# Patient Record
Sex: FEMALE | Race: OTHER
[De-identification: names, ages, dates, MRNs, and addresses within clinical notes are randomized per-mention and may not be internally consistent; named-entity substitution may affect disease eponyms.]

---

## 2019-01-17 ENCOUNTER — HOSPITAL ENCOUNTER (INPATIENT)
Dept: HOSPITAL 72 - EMR | Age: 44
LOS: 1 days | Discharge: HOME | DRG: 242 | End: 2019-01-18
Payer: MEDICAID

## 2019-01-17 VITALS — SYSTOLIC BLOOD PRESSURE: 106 MMHG | DIASTOLIC BLOOD PRESSURE: 76 MMHG

## 2019-01-17 VITALS — DIASTOLIC BLOOD PRESSURE: 69 MMHG | SYSTOLIC BLOOD PRESSURE: 115 MMHG

## 2019-01-17 VITALS — DIASTOLIC BLOOD PRESSURE: 78 MMHG | SYSTOLIC BLOOD PRESSURE: 101 MMHG

## 2019-01-17 VITALS — DIASTOLIC BLOOD PRESSURE: 64 MMHG | SYSTOLIC BLOOD PRESSURE: 116 MMHG

## 2019-01-17 VITALS — DIASTOLIC BLOOD PRESSURE: 71 MMHG | SYSTOLIC BLOOD PRESSURE: 125 MMHG

## 2019-01-17 VITALS — SYSTOLIC BLOOD PRESSURE: 108 MMHG | DIASTOLIC BLOOD PRESSURE: 52 MMHG

## 2019-01-17 VITALS — SYSTOLIC BLOOD PRESSURE: 117 MMHG | DIASTOLIC BLOOD PRESSURE: 73 MMHG

## 2019-01-17 VITALS — HEIGHT: 57 IN | WEIGHT: 110 LBS | BODY MASS INDEX: 23.73 KG/M2

## 2019-01-17 VITALS — DIASTOLIC BLOOD PRESSURE: 75 MMHG | SYSTOLIC BLOOD PRESSURE: 124 MMHG

## 2019-01-17 VITALS — SYSTOLIC BLOOD PRESSURE: 122 MMHG | DIASTOLIC BLOOD PRESSURE: 69 MMHG

## 2019-01-17 VITALS — SYSTOLIC BLOOD PRESSURE: 101 MMHG | DIASTOLIC BLOOD PRESSURE: 78 MMHG

## 2019-01-17 DIAGNOSIS — F10.129: ICD-10-CM

## 2019-01-17 DIAGNOSIS — R57.8: ICD-10-CM

## 2019-01-17 DIAGNOSIS — K29.70: ICD-10-CM

## 2019-01-17 DIAGNOSIS — K22.6: Primary | ICD-10-CM

## 2019-01-17 LAB
ADD MANUAL DIFF: NO
ADD MANUAL DIFF: NO
ALBUMIN SERPL-MCNC: 3.4 G/DL (ref 3.4–5)
ALBUMIN/GLOB SERPL: 1 {RATIO} (ref 1–2.7)
ALP SERPL-CCNC: 91 U/L (ref 46–116)
ALT SERPL-CCNC: 30 U/L (ref 12–78)
ANION GAP SERPL CALC-SCNC: 14 MMOL/L (ref 5–15)
APPEARANCE UR: CLEAR
APTT BLD: 21 SEC (ref 23–33)
APTT PPP: (no result) S
AST SERPL-CCNC: 29 U/L (ref 15–37)
BASOPHILS NFR BLD AUTO: 0.5 % (ref 0–2)
BASOPHILS NFR BLD AUTO: 0.6 % (ref 0–2)
BILIRUB SERPL-MCNC: 0.4 MG/DL (ref 0.2–1)
BUN SERPL-MCNC: 38 MG/DL (ref 7–18)
CALCIUM SERPL-MCNC: 8.9 MG/DL (ref 8.5–10.1)
CHLORIDE SERPL-SCNC: 101 MMOL/L (ref 98–107)
CO2 SERPL-SCNC: 24 MMOL/L (ref 21–32)
CREAT SERPL-MCNC: 0.9 MG/DL (ref 0.55–1.3)
EOSINOPHIL NFR BLD AUTO: 0.1 % (ref 0–3)
EOSINOPHIL NFR BLD AUTO: 0.5 % (ref 0–3)
ERYTHROCYTE [DISTWIDTH] IN BLOOD BY AUTOMATED COUNT: 14.5 % (ref 11.6–14.8)
ERYTHROCYTE [DISTWIDTH] IN BLOOD BY AUTOMATED COUNT: 14.7 % (ref 11.6–14.8)
GLOBULIN SER-MCNC: 3.3 G/DL
GLUCOSE UR STRIP-MCNC: NEGATIVE MG/DL
HCT VFR BLD CALC: 26.3 % (ref 37–47)
HCT VFR BLD CALC: 31.7 % (ref 37–47)
HGB BLD-MCNC: 10.6 G/DL (ref 12–16)
HGB BLD-MCNC: 8.5 G/DL (ref 12–16)
INR PPP: 1 (ref 0.9–1.1)
KETONES UR QL STRIP: (no result)
LEUKOCYTE ESTERASE UR QL STRIP: (no result)
LYMPHOCYTES NFR BLD AUTO: 28.1 % (ref 20–45)
LYMPHOCYTES NFR BLD AUTO: 9.7 % (ref 20–45)
MCV RBC AUTO: 91 FL (ref 80–99)
MCV RBC AUTO: 92 FL (ref 80–99)
MONOCYTES NFR BLD AUTO: 6.5 % (ref 1–10)
MONOCYTES NFR BLD AUTO: 9.2 % (ref 1–10)
NEUTROPHILS NFR BLD AUTO: 61.7 % (ref 45–75)
NEUTROPHILS NFR BLD AUTO: 83.3 % (ref 45–75)
NITRITE UR QL STRIP: NEGATIVE
PH UR STRIP: 6 [PH] (ref 4.5–8)
PHOSPHATE SERPL-MCNC: 3.1 MG/DL (ref 2.5–4.9)
PLATELET # BLD: 190 K/UL (ref 150–450)
PLATELET # BLD: 247 K/UL (ref 150–450)
POTASSIUM SERPL-SCNC: 3.8 MMOL/L (ref 3.5–5.1)
PROT UR QL STRIP: (no result)
RBC # BLD AUTO: 2.87 M/UL (ref 4.2–5.4)
RBC # BLD AUTO: 3.49 M/UL (ref 4.2–5.4)
SODIUM SERPL-SCNC: 139 MMOL/L (ref 136–145)
SP GR UR STRIP: 1.01 (ref 1–1.03)
UROBILINOGEN UR-MCNC: NORMAL MG/DL (ref 0–1)
WBC # BLD AUTO: 17.3 K/UL (ref 4.8–10.8)
WBC # BLD AUTO: 9.6 K/UL (ref 4.8–10.8)

## 2019-01-17 PROCEDURE — 80329 ANALGESICS NON-OPIOID 1 OR 2: CPT

## 2019-01-17 PROCEDURE — 36415 COLL VENOUS BLD VENIPUNCTURE: CPT

## 2019-01-17 PROCEDURE — 96375 TX/PRO/DX INJ NEW DRUG ADDON: CPT

## 2019-01-17 PROCEDURE — 81025 URINE PREGNANCY TEST: CPT

## 2019-01-17 PROCEDURE — 0DB68ZX EXCISION OF STOMACH, VIA NATURAL OR ARTIFICIAL OPENING ENDOSCOPIC, DIAGNOSTIC: ICD-10-PCS

## 2019-01-17 PROCEDURE — 81003 URINALYSIS AUTO W/O SCOPE: CPT

## 2019-01-17 PROCEDURE — 0DB78ZX EXCISION OF STOMACH, PYLORUS, VIA NATURAL OR ARTIFICIAL OPENING ENDOSCOPIC, DIAGNOSTIC: ICD-10-PCS

## 2019-01-17 PROCEDURE — 94003 VENT MGMT INPAT SUBQ DAY: CPT

## 2019-01-17 PROCEDURE — 83735 ASSAY OF MAGNESIUM: CPT

## 2019-01-17 PROCEDURE — 99285 EMERGENCY DEPT VISIT HI MDM: CPT

## 2019-01-17 PROCEDURE — 85610 PROTHROMBIN TIME: CPT

## 2019-01-17 PROCEDURE — 93970 EXTREMITY STUDY: CPT

## 2019-01-17 PROCEDURE — 94150 VITAL CAPACITY TEST: CPT

## 2019-01-17 PROCEDURE — 86900 BLOOD TYPING SEROLOGIC ABO: CPT

## 2019-01-17 PROCEDURE — 86850 RBC ANTIBODY SCREEN: CPT

## 2019-01-17 PROCEDURE — 85007 BL SMEAR W/DIFF WBC COUNT: CPT

## 2019-01-17 PROCEDURE — 85730 THROMBOPLASTIN TIME PARTIAL: CPT

## 2019-01-17 PROCEDURE — 96361 HYDRATE IV INFUSION ADD-ON: CPT

## 2019-01-17 PROCEDURE — 84100 ASSAY OF PHOSPHORUS: CPT

## 2019-01-17 PROCEDURE — 93005 ELECTROCARDIOGRAM TRACING: CPT

## 2019-01-17 PROCEDURE — 82150 ASSAY OF AMYLASE: CPT

## 2019-01-17 PROCEDURE — 80307 DRUG TEST PRSMV CHEM ANLYZR: CPT

## 2019-01-17 PROCEDURE — 80053 COMPREHEN METABOLIC PANEL: CPT

## 2019-01-17 PROCEDURE — 86901 BLOOD TYPING SEROLOGIC RH(D): CPT

## 2019-01-17 PROCEDURE — 85025 COMPLETE CBC W/AUTO DIFF WBC: CPT

## 2019-01-17 PROCEDURE — 96374 THER/PROPH/DIAG INJ IV PUSH: CPT

## 2019-01-17 PROCEDURE — 83690 ASSAY OF LIPASE: CPT

## 2019-01-17 RX ADMIN — HUMAN INSULIN SCH MLS/HR: 100 INJECTION, SOLUTION SUBCUTANEOUS at 15:52

## 2019-01-17 NOTE — NUR
ED Nurse Note:



RECIEVED PT FROM HOME ACTIVELY VOMITING, HERE WITH C/O NAUSEA, EMESIS AND ABD 
PAIN X 1 DAY, TP HAS HX OF ETOH BUT STATES THIS FEEL DIFFERENT AND SHE REPORTS 
BLOODY EMESIS, PT GOWNED AND PALCED ON CARDIAC MONITORING, PT IS Ivorian 
SPEAKING ONLY, PT DAUGHTER AT BEDSIDE AND ASSISTING WITH TRANSLATION, PT DENEIS 
CP, NO SOB OR LABORED BREATHING, WILL CLOSELY MONITOR AND RESUME CARE AS 
ORDERED.

## 2019-01-17 NOTE — NUR
NURSE NOTES:



PATIENT BACK TO UNIT FROM PACU. VSS. DENIES ANY PAIN AS OF THE MOMENT. NO SIGNS OF DISTRESS. 
WILL CONTINUE TO MONITOR.

## 2019-01-17 NOTE — NUR
ED Nurse Note:



Pt remaining bed without visible s/s of cardiac or pulmonary distress, Pt 
verbalized pain abdominal pain level 5/10 with cramping. Pt reported that she 
vomited x1 since she got to ER but before 7am. Pt denied chest pain, N/V/D. No 
external bleeding noted. Skin intact and warm to touch. Daughter at bedside.

## 2019-01-17 NOTE — ENDOSCOPY PROCEDURE NOTE
Endoscopy Procedure Note


General


Indication for Procedure:  gib


Procedures Performed:  EGD


Operative Findings/Diagnosis:  MWT


Specimen:  yes


Pt Tolerated Procedure Well:  Yes


Estimated Blood Loss:  none





Anesthesia


Anesthesiologist:  albert


Anesthesia:  MAC





Inserted Devices


Implant(s) used?:  No





GI Core Measures


50 yrs or older w/o bx or poly:  Not Applicable


10yrs. F/U not recommended:  Not Applicable











Pio Sinclair MD Jan 17, 2019 12:10

## 2019-01-17 NOTE — GI INITIAL CONSULT NOTE
History of Present Illness


General


Date patient seen:  Jan 17, 2019


Time patient seen:  11:42


Reason for Hospitalization:  Gastrointestinal Bleed


Referring physician:  AMADA ARROYO


Reason for Consultation:  UGIB





Present Illness


HPI


43-year-old female patient presented in the emergency room complaining 

excessive emesis with melena and hematochezia.  The patient has admitted to 

excessive alcohol abuse in the past.  The patient presents today with 

normocytic anemia and leukocytosis.  No known history of endoscopic colonoscopy.


Home Meds


Active Scripts


Guaifenesin (Guaifenesin) 1,200 Mg Tab.er.12h, 1200 MG PO Q12HR for 10 Days, #

20 TAB


   Prov:Svitlana Alvarez         10/30/17


Albuterol Sulfate* (ALBUTEROL SULFATE MDI*) 8.5 Gm Hfa.aer.ad, 2 PUFF INH Q3H, #

1 INH 0 Refills


   Prov:Svitlana Alvarez         10/30/17


Azithromycin* (ZITHROMAX*) 250 Mg Tablet, 250 MG ORAL DAILY, #6 TAB 0 Refills


   Take two tables once daily for 1 day, then one tablet once daily for 4


   days.


   Prov:Svitlana Alvarez         10/30/17


Codeine/Promethazine Hcl* (PROMETHAZINE-CODEINE SYRUP*) 118 Ml Syrup, 5 ML ORAL 

Q6H PRN for For Cough, #120 ML 0 Refills


   Prov:Svitlana Alvarez         10/30/17


Tramadol Hcl* (ULTRAM*) 50 Mg Tablet, 50 MG ORAL Q6H PRN for For Pain, #20 TAB


   Prov:CARMEN SEPULVEDA D.O.         4/14/15


Reported Medications


No Known Medications* (NKM - No Known Medications*)  ., 0 ., 0 Refills


   1/17/19


Med list reviewed/reconciled:  Yes


Allergies:  


Coded Allergies:  


     No Known Allergies (Unverified , 9/23/12)





Patient History


History Provided By:  Patient, Medical Record


Social History:  Reports: alcohol use





Review of Systems


All Other Systems:  negative except mentioned in HPI





Physical Exam





Vital Signs








  Date Time  Temp Pulse Resp B/P (MAP) Pulse Ox O2 Delivery O2 Flow Rate FiO2


 


1/17/19 05:36 97.7 115 15 100/51 99 Room Air  


 


1/17/19 07:30        98








Sp02 EP Interpretation:  reviewed, normal


Labs





Laboratory Tests








Test


  1/17/19


05:55 1/17/19


07:40


 


White Blood Count


  17.3 K/UL


(4.8-10.8)  H 


 


 


Red Blood Count


  3.49 M/UL


(4.20-5.40)  L 


 


 


Hemoglobin


  10.6 G/DL


(12.0-16.0)  L 


 


 


Hematocrit


  31.7 %


(37.0-47.0)  L 


 


 


Mean Corpuscular Volume 91 FL (80-99)   


 


Mean Corpuscular Hemoglobin


  30.3 PG


(27.0-31.0) 


 


 


Mean Corpuscular Hemoglobin


Concent 33.4 G/DL


(32.0-36.0) 


 


 


Red Cell Distribution Width


  14.5 %


(11.6-14.8) 


 


 


Platelet Count


  247 K/UL


(150-450) 


 


 


Mean Platelet Volume


  10.0 FL


(6.5-10.1) 


 


 


Neutrophils (%) (Auto)


  83.3 %


(45.0-75.0)  H 


 


 


Lymphocytes (%) (Auto)


  9.7 %


(20.0-45.0)  L 


 


 


Monocytes (%) (Auto)


  6.5 %


(1.0-10.0) 


 


 


Eosinophils (%) (Auto)


  0.1 %


(0.0-3.0) 


 


 


Basophils (%) (Auto)


  0.5 %


(0.0-2.0) 


 


 


Prothrombin Time


  10.4 SEC


(9.30-11.50) 


 


 


Prothromb Time International


Ratio 1.0 (0.9-1.1)  


  


 


 


Activated Partial


Thromboplast Time 21 SEC (23-33)


L 


 


 


Sodium Level


  139 MMOL/L


(136-145) 


 


 


Potassium Level


  3.8 MMOL/L


(3.5-5.1) 


 


 


Chloride Level


  101 MMOL/L


() 


 


 


Carbon Dioxide Level


  24 MMOL/L


(21-32) 


 


 


Anion Gap


  14 mmol/L


(5-15) 


 


 


Blood Urea Nitrogen


  38 mg/dL


(7-18)  H 


 


 


Creatinine


  0.9 MG/DL


(0.55-1.30) 


 


 


Estimat Glomerular Filtration


Rate > 60 mL/min


(>60) 


 


 


Glucose Level


  167 MG/DL


()  H 


 


 


Calcium Level


  8.9 MG/DL


(8.5-10.1) 


 


 


Phosphorus Level


  3.1 MG/DL


(2.5-4.9) 


 


 


Magnesium Level


  1.6 MG/DL


(1.8-2.4)  L 


 


 


Total Bilirubin


  0.4 MG/DL


(0.2-1.0) 


 


 


Aspartate Amino Transf


(AST/SGOT) 29 U/L (15-37)


  


 


 


Alanine Aminotransferase


(ALT/SGPT) 30 U/L (12-78)


  


 


 


Alkaline Phosphatase


  91 U/L


() 


 


 


Total Protein


  6.7 G/DL


(6.4-8.2) 


 


 


Albumin


  3.4 G/DL


(3.4-5.0) 


 


 


Globulin 3.3 g/dL   


 


Albumin/Globulin Ratio 1.0 (1.0-2.7)   


 


Lipase


  80 U/L


() 


 


 


Salicylates Level


  0.7 ug/mL


(2.8-20)  L 


 


 


Acetaminophen Level


  < 2 MCG/ML


(10-30)  L 


 


 


Serum Alcohol < 3 mg/dL   


 


Urine Color  Pale yellow  


 


Urine Appearance  Clear  


 


Urine pH  6 (4.5-8.0)  


 


Urine Specific Gravity


  


  1.015


(1.005-1.035)


 


Urine Protein


  


  1+ (NEGATIVE)


H


 


Urine Glucose (UA)


  


  Negative


(NEGATIVE)


 


Urine Ketones


  


  3+ (NEGATIVE)


H


 


Urine Blood


  


  1+ (NEGATIVE)


H


 


Urine Nitrite


  


  Negative


(NEGATIVE)


 


Urine Bilirubin


  


  Negative


(NEGATIVE)


 


Urine Urobilinogen


  


  Normal MG/DL


(0.0-1.0)


 


Urine Leukocyte Esterase


  


  3+ (NEGATIVE)


H


 


Urine RBC


  


  0-2 /HPF (0 -


2)


 


Urine WBC


  


  2-4 /HPF (0 -


2)


 


Urine Squamous Epithelial


Cells 


  Few /LPF


(NONE/OCC)


 


Urine Bacteria


  


  Few /HPF


(NONE)


 


Urine HCG, Qualitative


  


  Negative


(NEGATIVE)


 


Urine Opiates Screen


  


  Negative


(NEGATIVE)


 


Urine Barbiturates Screen


  


  Negative


(NEGATIVE)


 


Phencyclidine (PCP) Screen


  


  Negative


(NEGATIVE)


 


Urine Amphetamines Screen


  


  Negative


(NEGATIVE)


 


Urine Benzodiazepines Screen


  


  Negative


(NEGATIVE)


 


Urine Cocaine Screen


  


  Negative


(NEGATIVE)


 


Urine Marijuana (THC) Screen


  


  Negative


(NEGATIVE)








General Appearance:  well appearing, no apparent distress, alert


Head:  normocephalic


EENT:  PERRL/EOMI, normal ENT inspection


Neck:  supple


Respiratory:  normal breath sounds, no respiratory distress


Cardiovascular:  normal rate


Gastrointestinal:  normal inspection, non tender, soft, normal bowel sounds, non

-distended


Rectal:  deferred


Genitourinary:  no CVA tenderness


Musculoskeletal:  normal inspection, back normal


Neurologic:  normal inspection, alert, oriented x3, responsive


Psychiatric:  normal inspection, judgement/insight normal, memory normal


Skin:  normal inspection, normal color, no rash, warm/dry, palpation normal, 

well hydrated


Lymphatic:  normal inspection, no adenopathy


Current Medications





Current Medications








 Medications


  (Trade)  Dose


 Ordered  Sig/Heladio


 Route


 PRN Reason  Start Time


 Stop Time Status Last Admin


Dose Admin


 


 Acetaminophen


  (Tylenol)  650 mg  Q4H  PRN


 ORAL


 fever (temp>100.5F)  1/17/19 10:00


 2/16/19 09:59   


 


 


 Al Hydroxide/Mg


 Hydroxide


  (Mylanta II)  30 ml  Q6H  PRN


 ORAL


 dyspepsia  1/17/19 10:00


 2/16/19 09:59   


 


 


 Dextrose


  (Dextrose 50%)  25 ml  Q30M  PRN


 IV


 Hypoglycemia  1/17/19 10:00


 2/16/19 09:59   


 


 


 Dextrose


  (Dextrose 50%)  50 ml  Q30M  PRN


 IV


 Hypoglycemia  1/17/19 10:00


 2/16/19 09:59   


 


 


 Dextrose/Sodium


 Chloride  1,000 ml @ 


 100 mls/hr  Q10H


 IV


   1/17/19 10:15


 2/16/19 10:14   


 


 


 Diphenhydramine


 HCl


  (Benadryl)  25 mg  Q6H  PRN


 ORAL


 Itching/Pruritis  1/17/19 10:00


 2/16/19 09:59   


 


 


 Ondansetron HCl


  (Zofran)  4 mg  Q6H  PRN


 IVP


 Nausea & Vomiting  1/17/19 10:00


 2/16/19 09:59   


 


 


 Phytonadione 10


 mg/Dextrose  56 ml @ 


 110 mls/hr  ONCE  ONCE


 IVPB


   1/17/19 12:00


 1/17/19 12:30   


 


 


 Temazepam


  (Restoril)  15 mg  HSPRN  PRN


 ORAL


 Insomnia  1/17/19 10:00


 1/24/19 09:59   


 











GI: Plan


Problems:  


(1) GI bleed


(2) Hemorrhagic shock


(3) ETOH abuse


Plan


EGD scheduled for today


Maintain n.p.o. IV fluids


PPI twice daily


Zofran as needed


We will follow with additional recommendations post procedure





Discussed with Dr. Sinclair.


Thank you for this patient referral, we will follow.





The patient was seen and examined at bedside and all new and available data was 

reviewed in the patients chart. I agree with the above findings, impression 

and plan.  (Patient seen earlier today. Signature stamp does not reflect 

patient encounter time.). - MD Anjelica Becker,[facility] CenterRomulo NP Jan 17, 2019 11:45

## 2019-01-17 NOTE — EMERGENCY ROOM REPORT
History of Present Illness


General


Chief Complaint:  Gastrointestinal Bleed


Source:  Patient, Medical Record





Present Illness


HPI


Patient is a 43-year-old female presented after increased hematemesis.  Patient 

had gradual onset of symptoms.  She reports of increased heavy alcohol use.  

Patient had prior history of patient reports having associated epigastric 

discomfort.  She had been noted to have multiple episodes which she vomited 

some dark colored material.  Patient denies any fever.  She denies any NSAID 

use.  She had reportedly been drinking heavily over the past few days.


Allergies:  


Coded Allergies:  


     No Known Allergies (Unverified , 9/23/12)





Patient History


Past Medical History:  see triage record


Last Menstrual Period:  UNK


Pregnant Now:  No


Reviewed Nursing Documentation:  PMH: Agreed; PSxH: Agreed





Nursing Documentation-PMH


Past Medical History:  No History, Except For


Hx Cardiac Problems:  No


Hx Cancer:  No


Hx Gastrointestinal Problems:  No


Hx Neurological Problems:  No





Review of Systems


All Other Systems:  negative except mentioned in HPI





Physical Exam





Vital Signs








  Date Time  Temp Pulse Resp B/P (MAP) Pulse Ox O2 Delivery O2 Flow Rate FiO2


 


1/17/19 05:36 97.7 115 15 100/51 99 Room Air  


 


1/17/19 07:30        98


 


1/17/19 12:18       3 








Sp02 EP Interpretation:  reviewed, normal


General Appearance:  normal inspection, alert, GCS 15, moderate distress


Head:  atraumatic


ENT:  normal ENT inspection, hearing grossly normal, normal voice


Neck:  normal inspection, full range of motion, supple, no bony tend


Respiratory:  normal inspection, lungs clear, normal breath sounds, no 

respiratory distress, no retraction, no wheezing


Cardiovascular #1:  no edema, tachycardia


Gastrointestinal:  normal inspection, normal bowel sounds, non tender, soft, no 

guarding, no hernia


Genitourinary:  no CVA tenderness


Musculoskeletal:  normal inspection, back normal, normal range of motion


Neurologic:  normal inspection, alert, oriented x3, responsive, CNs III-XII nml 

as tested, speech normal


Psychiatric:  normal inspection, judgement/insight normal, mood/affect normal


Skin:  normal inspection, normal color, no rash





Medical Decision Making


Diagnostic Impression:  


 Primary Impression:  


 GI bleed


 Additional Impression:  


 Alcohol abuse


ER Course


.  Patient presented for abdominal pain. Differential diagnoses included 

ischemic bowel, appendicitis, perforated viscus, abdominal aortic aneurysm, 

inferior myocardial infarction, viral gastroenteritis. because of complexity of 

patient's case laboratory testing and imaging studies were ordered.  Patient 

was noted to have some evidence of anemia.  She was initially noted to be 

somewhat tachycardic.  Patient's initial hemoglobin was noted to be adequate 

patient was started on IV acid blockers as well as IV fluids.  Dr. Norberto Cintron 

was contacted for inpatient management





Laboratory Tests








Test


  1/17/19


05:55 1/17/19


07:40 1/17/19


17:45


 


White Blood Count


  17.3 K/UL


(4.8-10.8)  H 


  9.6 K/UL


(4.8-10.8)


 


Red Blood Count


  3.49 M/UL


(4.20-5.40)  L 


  2.87 M/UL


(4.20-5.40)  L


 


Hemoglobin


  10.6 G/DL


(12.0-16.0)  L 


  8.5 G/DL


(12.0-16.0)  L


 


Hematocrit


  31.7 %


(37.0-47.0)  L 


  26.3 %


(37.0-47.0)  L


 


Mean Corpuscular Volume 91 FL (80-99)    92 FL (80-99)  


 


Mean Corpuscular Hemoglobin


  30.3 PG


(27.0-31.0) 


  29.8 PG


(27.0-31.0)


 


Mean Corpuscular Hemoglobin


Concent 33.4 G/DL


(32.0-36.0) 


  32.5 G/DL


(32.0-36.0)


 


Red Cell Distribution Width


  14.5 %


(11.6-14.8) 


  14.7 %


(11.6-14.8)


 


Platelet Count


  247 K/UL


(150-450) 


  190 K/UL


(150-450)


 


Mean Platelet Volume


  10.0 FL


(6.5-10.1) 


  10.5 FL


(6.5-10.1)  H


 


Neutrophils (%) (Auto)


  83.3 %


(45.0-75.0)  H 


  61.7 %


(45.0-75.0)


 


Lymphocytes (%) (Auto)


  9.7 %


(20.0-45.0)  L 


  28.1 %


(20.0-45.0)


 


Monocytes (%) (Auto)


  6.5 %


(1.0-10.0) 


  9.2 %


(1.0-10.0)


 


Eosinophils (%) (Auto)


  0.1 %


(0.0-3.0) 


  0.5 %


(0.0-3.0)


 


Basophils (%) (Auto)


  0.5 %


(0.0-2.0) 


  0.6 %


(0.0-2.0)


 


Prothrombin Time


  10.4 SEC


(9.30-11.50) 


  


 


 


Prothrombin Time INR 1.0 (0.9-1.1)    


 


PTT


  21 SEC (23-33)


L 


  


 


 


Sodium Level


  139 MMOL/L


(136-145) 


  


 


 


Potassium Level


  3.8 MMOL/L


(3.5-5.1) 


  


 


 


Chloride Level


  101 MMOL/L


() 


  


 


 


Carbon Dioxide Level


  24 MMOL/L


(21-32) 


  


 


 


Anion Gap


  14 mmol/L


(5-15) 


  


 


 


Blood Urea Nitrogen


  38 mg/dL


(7-18)  H 


  


 


 


Creatinine


  0.9 MG/DL


(0.55-1.30) 


  


 


 


Estimate Glomerular


Filtration Rate > 60 mL/min


(>60) 


  


 


 


Glucose Level


  167 MG/DL


()  H 


  


 


 


Calcium Level


  8.9 MG/DL


(8.5-10.1) 


  


 


 


Phosphorus Level


  3.1 MG/DL


(2.5-4.9) 


  


 


 


Magnesium Level


  1.6 MG/DL


(1.8-2.4)  L 


  


 


 


Total Bilirubin


  0.4 MG/DL


(0.2-1.0) 


  


 


 


Aspartate Amino Transferase


(AST) 29 U/L (15-37)


  


  


 


 


Alanine Aminotransferase (ALT)


  30 U/L (12-78)


  


  


 


 


Alkaline Phosphatase


  91 U/L


() 


  


 


 


Total Protein


  6.7 G/DL


(6.4-8.2) 


  


 


 


Albumin


  3.4 G/DL


(3.4-5.0) 


  


 


 


Globulin 3.3 g/dL    


 


Albumin/Globulin Ratio 1.0 (1.0-2.7)    


 


Lipase


  80 U/L


() 


  


 


 


Salicylates Level


  0.7 ug/mL


(2.8-20)  L 


  


 


 


Acetaminophen Level


  < 2 MCG/ML


(10-30)  L 


  


 


 


Serum Alcohol < 3 mg/dL    


 


Urine Color  Pale yellow   


 


Urine Appearance  Clear   


 


Urine pH  6 (4.5-8.0)   


 


Urine Specific Gravity


  


  1.015


(1.005-1.035) 


 


 


Urine Protein


  


  1+ (NEGATIVE)


H 


 


 


Urine Glucose (UA)


  


  Negative


(NEGATIVE) 


 


 


Urine Ketones


  


  3+ (NEGATIVE)


H 


 


 


Urine Blood


  


  1+ (NEGATIVE)


H 


 


 


Urine Nitrite


  


  Negative


(NEGATIVE) 


 


 


Urine Bilirubin


  


  Negative


(NEGATIVE) 


 


 


Urine Urobilinogen


  


  Normal MG/DL


(0.0-1.0) 


 


 


Urine Leukocyte Esterase


  


  3+ (NEGATIVE)


H 


 


 


Urine RBC


  


  0-2 /HPF (0 -


2) 


 


 


Urine WBC


  


  2-4 /HPF (0 -


2) 


 


 


Urine Squamous Epithelial


Cells 


  Few /LPF


(NONE/OCC) 


 


 


Urine Bacteria


  


  Few /HPF


(NONE) 


 


 


Urine HCG, Qualitative


  


  Negative


(NEGATIVE) 


 


 


Urine Opiates Screen


  


  Negative


(NEGATIVE) 


 


 


Urine Barbiturates Screen


  


  Negative


(NEGATIVE) 


 


 


Phencyclidine (PCP) Screen


  


  Negative


(NEGATIVE) 


 


 


Urine Amphetamines Screen


  


  Negative


(NEGATIVE) 


 


 


Urine Benzodiazepines Screen


  


  Negative


(NEGATIVE) 


 


 


Urine Cocaine Screen


  


  Negative


(NEGATIVE) 


 


 


Urine Marijuana (THC) Screen


  


  Negative


(NEGATIVE) 


 











Last Vital Signs








  Date Time  Temp Pulse Resp B/P (MAP) Pulse Ox O2 Delivery O2 Flow Rate FiO2


 


1/17/19 20:00 98.4 96 18 108/52 (70)    


 


1/17/19 18:29     100   


 


1/17/19 12:35      Nasal Cannula 3 


 


1/17/19 08:20        98








Status:  unchanged


Disposition:  ADMITTED AS INPATIENT


Condition:  Serious


Referrals:  


NON PHYSICIAN (PCP)











Paul Lenz MD Jan 17, 2019 22:30

## 2019-01-17 NOTE — HISTORY AND PHYSICAL REPORT
DATE OF ADMISSION:  01/17/2019

CHIEF COMPLAINT:  The patient is a 43-year-old  female, who

presents with a chief complaint of abdominal pain, nausea, vomiting, and

hematemesis.



HISTORY OF PRESENT ILLNESS:  Began on Tuesday, 01/15/2019.  The patient

began to experience darker colored vomitus.  The patient also had

epigastric pain.  The patient also noticed her stools were dark.



The patient states this progressed to blood-tinged vomit.  The patient

presented to [place] emergency room.  The patient is admitted with

hematemesis and melena to rule out gastrointestinal hemorrhage.



REVIEW OF SYSTEMS:  CONSTITUTIONAL:  The patient denies weight loss or

gain.  The patient denies fevers or chills.  HEENT:  The patient denies

ear or throat pain.  The patient denies headache.  CARDIOVASCULAR:  The

patient denies palpitations or chest pain.  CHEST:  The patient denies

wheeze or shortness of breath.  ABDOMEN:  The patient complains of

epigastric pain as above.  The patient complains of nausea and vomiting.

The patient complains of hematemesis.  The patient complains of melena.

GENITOURINARY:  The patient denies dysuria or increased frequency of

urination.  NEUROMUSCULAR:  The patient denies seizures or generalized

weakness.



PAST MEDICAL HISTORY:  The patient denies.



PAST SURGICAL HISTORY:  The patient denies.



CURRENT MEDICATIONS:  The patient denies.



ALLERGIES:  No known drug allergies.



SOCIAL HISTORY:  The patient is  and works as an aide in a nursing

home.  The patient denies tobacco use.  The patient admits to alcohol use.

The patient states she binges on the weekends with 1 to 4 drinks daily on

the weekends.



PHYSICAL EXAMINATION:

VITAL SIGNS:  Temperature 98.2, respirations 18, pulse 117, and blood

pressure 134/75.

GENERAL:  The patient is a well-developed and well-nourished 

female, in no apparent distress.

HEENT:  Eyes, pupils are equal and responsive to light and accommodation.

Extraocular movements are intact.

NECK:  Supple without lymphadenopathy.

CHEST:  Lungs are clear to auscultation bilaterally without wheezes or

rales.

CARDIOVASCULAR:  Regular rhythm and rate.  S1, S2 are normal without

murmurs, rubs, or gallops.

ABDOMEN:  Soft and tender to palpation in the epigastric region without

rebound or guarding noted.

EXTREMITIES:  Negative for clubbing, cyanosis, or edema.

RECTAL/GENITAL:  Refused.

NEUROLOGIC:  Cranial nerves II through XII are grossly intact without focal

deficits.  Motor strength is 5/5 bilaterally.  Deep tendon reflexes are 2+

plantar.



LABORATORY STUDIES:  WBC 17.2, hemoglobin 10.6, hematocrit 31.7, and

platelets 247,000.  Sodium 139, potassium 3.8, chloride 101, CO2 24, BUN

38, creatinine 0.9, and glucose 167.



ASSESSMENT:  The patient is a 43-year-old  female.



1. Hematemesis.

2. Melena.

3. Nausea with vomiting.

4. Epigastric pain.



TREATMENT:  Hematemesis/melena/nausea with vomiting.  A Gastroenterology

consultation has been obtained with Dr. Pio Sinclair.  The patient is

waiting endoscopy.  The patient has been started empirically on Protonix

for presumed gastrointestinal hemorrhage.  We will follow recommendations

of Gastroenterology.









  ______________________________________________

  Osiel Vidal M.D.





DR:  PEACE

D:  01/17/2019 17:25

T:  01/17/2019 19:19

JOB#:  [id number]

CC:

## 2019-01-17 NOTE — NUR
TRANSFER TO FLOOR:



Patient transferred to [facility] per [facility]. Report given to TIMO Jones RN .  Belongings given to 
KAYLIN. Dgt informed of transfer. VSS.

## 2019-01-17 NOTE — IMMEDIATE POST-OP EVALUATION
Immediate Post-Op Evalulation


Immediate Post-Op Evalulation


Procedure:  egd/bx


Date of Evaluation:  Jan 17, 2019


Time of Evaluation:  12:30


IV Fluids:  150ml 0.9ns


Blood Products:  none


Estimated Blood Loss:  negligible


Blood Pressure Systolic:  124


Blood Pressure Diastolic:  75


Pulse Rate:  117


Respiratory Rate:  18


O2 Sat by Pulse Oximetry:  100


Temperature (Fahrenheit):  98.2


Pain Score (1-10):  0


Nausea:  No


Vomiting:  No


Complications


none


Patient Status:  awake, reacts, patent


Hydration Status:  adequate


Drug:  Maribell Zambrano MD Jan 17, 2019 18:27

## 2019-01-17 NOTE — NUR
ED Nurse Note:



Pt was transferred in stable condition to [facility] and report and belonging list 
given to CLIFF Riley.

## 2019-01-17 NOTE — HISTORY & PHYSICAL
History and Physical


History & Physicial


Dictated for Int Med-Dr Cintron no. [id number].











Osiel Vidal MD Jan 17, 2019 17:25

## 2019-01-17 NOTE — NUR
NURSE NOTES:



PATIENT WHEELED DOWN TO GI LAB BY TRANSPORTER WITH OFF TELE ORDER. VSS. NO SIGNS OF 
DISTRESS. REPORT GIVEN TO NOD IN GI LAB THROUGH PHONE. WILL CONTINUE TO MONITOR.

## 2019-01-17 NOTE — 48 HOUR POST ANESTHESIA EVAL
Post Anesthesia Evaluation


Procedure:  egd/bx


Date of Evaluation:  Jan 17, 2019


Time of Evaluation:  12:32


Blood Pressure Systolic:  125


0:  71


Pulse Rate:  99


Respiratory Rate:  18


Temperature (Fahrenheit):  98.2


O2 Sat by Pulse Oximetry:  100


Airway:  patent


Nausea:  No


Vomiting:  No


Pain Intensity:  0


Hydration Status:  adequate


Cardiopulmonary Status:


stable


Mental Status/LOC:  patient returned to baseline


Post-Anesthesia Complications:


none


Follow-up care needed:  N/A











Maribell Morgan MD Jan 17, 2019 18:29

## 2019-01-17 NOTE — PULMONOLGY CRITICAL CARE NOTE
Critical Care - Asmt/Plan


Problems:  


(1) Hemorrhagic shock


(2) GI bleed


(3) ETOH abuse


Respiratory:  adjust tidal volume, adjust FIO2


Cardiac:  continue to monitor HR/BP


Renal:  F/U I&O, keep IV fluid


Infectious Disease:  check cultures


Gastrointestinal:  hold feedings


Endocrine:  monitor blood sugar


Neurologic:  PRN Ativan, PRN Morphine


Prophylaxis:  SCDs


Time Spent (Minutes):  40


Notes Reviewed:  internist


Discussed with:  nurses, consultants, 





Critical Care - Objective





Last 24 Hour Vital Signs








  Date Time  Temp Pulse Resp B/P (MAP) Pulse Ox O2 Delivery O2 Flow Rate FiO2


 


1/17/19 08:20 98.3 88 19 101/78 98 Room Air  98





  96      


 


1/17/19 08:20 98.5 74 18 104/75 98 Room Air  


 


1/17/19 07:46 98.3 96 19 101/78 98 Room Air  


 


1/17/19 07:30  94 18   Room Air  98


 


1/17/19 06:30 97.7 88 18 116/64 99 Room Air  


 


1/17/19 05:36 97.7 115 15 100/51 99 Room Air  








Status:  awake


Condition:  critical


HEENT:  atraumatic


Abdomen:  soft, active bowel sounds


Extremities:  no C/C/E


Accucheck:  158





Critical Care - Subjective


ROS Limited/Unobtainable:  No


EKG Rhythm:  Sinus Rhythm


FI02:  98


Fluids:  ns


Labs:





Laboratory Tests








Test


  1/17/19


05:55 1/17/19


07:40


 


White Blood Count


  17.3 K/UL


(4.8-10.8)  H 


 


 


Red Blood Count


  3.49 M/UL


(4.20-5.40)  L 


 


 


Hemoglobin


  10.6 G/DL


(12.0-16.0)  L 


 


 


Hematocrit


  31.7 %


(37.0-47.0)  L 


 


 


Mean Corpuscular Volume 91 FL (80-99)   


 


Mean Corpuscular Hemoglobin


  30.3 PG


(27.0-31.0) 


 


 


Mean Corpuscular Hemoglobin


Concent 33.4 G/DL


(32.0-36.0) 


 


 


Red Cell Distribution Width


  14.5 %


(11.6-14.8) 


 


 


Platelet Count


  247 K/UL


(150-450) 


 


 


Mean Platelet Volume


  10.0 FL


(6.5-10.1) 


 


 


Neutrophils (%) (Auto)


  83.3 %


(45.0-75.0)  H 


 


 


Lymphocytes (%) (Auto)


  9.7 %


(20.0-45.0)  L 


 


 


Monocytes (%) (Auto)


  6.5 %


(1.0-10.0) 


 


 


Eosinophils (%) (Auto)


  0.1 %


(0.0-3.0) 


 


 


Basophils (%) (Auto)


  0.5 %


(0.0-2.0) 


 


 


Prothrombin Time


  10.4 SEC


(9.30-11.50) 


 


 


Prothromb Time International


Ratio 1.0 (0.9-1.1)  


  


 


 


Activated Partial


Thromboplast Time 21 SEC (23-33)


L 


 


 


Sodium Level


  139 MMOL/L


(136-145) 


 


 


Potassium Level


  3.8 MMOL/L


(3.5-5.1) 


 


 


Chloride Level


  101 MMOL/L


() 


 


 


Carbon Dioxide Level


  24 MMOL/L


(21-32) 


 


 


Anion Gap


  14 mmol/L


(5-15) 


 


 


Blood Urea Nitrogen


  38 mg/dL


(7-18)  H 


 


 


Creatinine


  0.9 MG/DL


(0.55-1.30) 


 


 


Estimat Glomerular Filtration


Rate > 60 mL/min


(>60) 


 


 


Glucose Level


  167 MG/DL


()  H 


 


 


Calcium Level


  8.9 MG/DL


(8.5-10.1) 


 


 


Phosphorus Level


  3.1 MG/DL


(2.5-4.9) 


 


 


Magnesium Level


  1.6 MG/DL


(1.8-2.4)  L 


 


 


Total Bilirubin


  0.4 MG/DL


(0.2-1.0) 


 


 


Aspartate Amino Transf


(AST/SGOT) 29 U/L (15-37)


  


 


 


Alanine Aminotransferase


(ALT/SGPT) 30 U/L (12-78)


  


 


 


Alkaline Phosphatase


  91 U/L


() 


 


 


Total Protein


  6.7 G/DL


(6.4-8.2) 


 


 


Albumin


  3.4 G/DL


(3.4-5.0) 


 


 


Globulin 3.3 g/dL   


 


Albumin/Globulin Ratio 1.0 (1.0-2.7)   


 


Lipase


  80 U/L


() 


 


 


Salicylates Level


  0.7 ug/mL


(2.8-20)  L 


 


 


Acetaminophen Level


  < 2 MCG/ML


(10-30)  L 


 


 


Serum Alcohol < 3 mg/dL   


 


Urine Color  Pale yellow  


 


Urine Appearance  Clear  


 


Urine pH  6 (4.5-8.0)  


 


Urine Specific Gravity


  


  1.015


(1.005-1.035)


 


Urine Protein


  


  1+ (NEGATIVE)


H


 


Urine Glucose (UA)


  


  Negative


(NEGATIVE)


 


Urine Ketones


  


  3+ (NEGATIVE)


H


 


Urine Blood


  


  1+ (NEGATIVE)


H


 


Urine Nitrite


  


  Negative


(NEGATIVE)


 


Urine Bilirubin


  


  Negative


(NEGATIVE)


 


Urine Urobilinogen


  


  Normal MG/DL


(0.0-1.0)


 


Urine Leukocyte Esterase


  


  3+ (NEGATIVE)


H


 


Urine RBC


  


  0-2 /HPF (0 -


2)


 


Urine WBC


  


  2-4 /HPF (0 -


2)


 


Urine Squamous Epithelial


Cells 


  Few /LPF


(NONE/OCC)


 


Urine Bacteria


  


  Few /HPF


(NONE)


 


Urine HCG, Qualitative


  


  Negative


(NEGATIVE)


 


Urine Opiates Screen


  


  Negative


(NEGATIVE)


 


Urine Barbiturates Screen


  


  Negative


(NEGATIVE)


 


Phencyclidine (PCP) Screen


  


  Negative


(NEGATIVE)


 


Urine Amphetamines Screen


  


  Negative


(NEGATIVE)


 


Urine Benzodiazepines Screen


  


  Negative


(NEGATIVE)


 


Urine Cocaine Screen


  


  Negative


(NEGATIVE)


 


Urine Marijuana (THC) Screen


  


  Negative


(NEGATIVE)

















Christina Wilson MD Jan 17, 2019 11:00

## 2019-01-17 NOTE — PRE-PROCEDURE NOTE/ATTESTATION
Pre-Procedure Note/Attestation


Complete Prior to Procedure


Planned Procedure:  not applicable


Procedure Narrative:


egd





Indications for Procedure


Pre-Operative Diagnosis:


gib





Attestation


I attest that I discussed the nature of the procedure; its benefits; risks and 

complications; and alternatives (and the risks and benefits of such alternatives

), prior to the procedure, with the patient (or the patient's legal 

representative).





I attest that, if there was a reasonable possibility of needing a blood 

transfusion, the patient (or the patient's legal representative) was given the 

[facility] standardized written summary, pursuant 

to the [place] Blood Safety Act ([place] Health and Safety Code # 1645, as 

amended).





I attest that I re-evaluated the patient just prior to the surgery and that 

there has been no change in the patient's H&P, except as documented below:











Pio Sinclair MD Jan 17, 2019 11:42

## 2019-01-17 NOTE — NUR
ED Nurse Note:



Seizure precaution siderail pad applied. RT was noted for ABG but RT was not 
able to obtain. RT informed MD. Pt urinated at bedside commode and was able to 
obtain urine sample. It was sent to the lab. Pt calm and resting in bed. 
Daughter at bedside. Waiting for the room to be available.

## 2019-01-17 NOTE — NUR
NURSE NOTES:



RECEIVED PATIENT FROM ER, REPORT GIVEN BY CLIFF LUCAS. PATIENT IS ABLE TO AMBULATE. ON ROOM 
AIR. KEPT ON NPO. DGT SEEN AT THE BEDSIDE. IV ON R AC G22, PATENT. SKIN THOROUGH ASSESSMENT 
DONE BUT REFUSED TO TAKE OFF PANTS. AS PER THE PATIENT, NO WOUNDS ON BILATERAL LOWER 
EXTREMITIES. ORIENTED TO ROOM. CALL LIGHT WITHIN REACH. SIDE RAILS UP. BED AT LOWEST 
POSITION. WILL CONTINUE TO MONITOR.

## 2019-01-17 NOTE — NUR
ED Nurse Note:



Report given to CLIFF Peguero at [facility].

-------------------------------------------------------------------------------

Addendum: 01/17/19 at 1007 by [email or web address]

-------------------------------------------------------------------------------

D Nurse Note:



Report given to CLIFF Riley at [facility].

## 2019-01-17 NOTE — PROCEDURE NOTE
DATE OF PROCEDURE:  01/17/2019

SURGEON:  Pio Sinclair M.D.



PROCEDURE:  Upper endoscopy with biopsy.



ANESTHESIA:  Per Dr. Gillis.



INSTRUMENT:  Olympus adult flexible upper endoscope.



INDICATION:  Upper GI bleeding.



REASON FOR PROCEDURE:  The procedure, risks, benefits, and possible

consequences, including hemorrhage, aspiration, perforation and infection,

and alternative treatments, were explained to the patient/legal guardian

by Dr. Pio Sinclair and the patient/legal guardian understood and

accepted these risks.



DESCRIPTION OF PROCEDURE:  After informed consent was obtained and and the

patient was adequately sedated, Olympus upper endoscope was advanced from

the mouth to the second portion of the duodenum and retroflexion performed

in the stomach.



The patient has evidence of Mela-Marshall tear at about 5 o'clock

position at the GE junction with maybe a little of adherent clot to it.

No active bleeding at this time.



There was evidence of some gastritis.  Random biopsy from antrum and

body was obtained to rule out H. pylori infection.  Otherwise, the rest of

the upper endoscopic examination was grossly within normal limits.  The

patient tolerated the procedure very well without any complication.



SUMMARY OF FINDINGS:

1. A single Mela-Marshall tear at the GE junction at about 5 o'clock

position with a small blood clot sitting on it.

2. Gastritis, status post biopsy.



RECOMMENDATIONS:

1. Continue on PPI.

2. Follow up biopsy results and treat accordingly.

3. Start soft diet.

4. Monitor for recurrent GI bleeding.



I want to thank, Dr. Norberto Cintron, for this kind referral.









  ______________________________________________

  Pio Sinclair M.D.





DR:  HARSHAL

D:  01/17/2019 12:16

T:  01/18/2019 02:00

JOB#:  [id number]

CC:  Norberto Cintron M.D.; Fax#:  [phone]

## 2019-01-17 NOTE — NUR
NURSE NOTES:

Received patient in bed, family at bedside, patient is awake, alert, Greek speaking only, 
no acute distress noted/reported, bed is in low position, locked and alarm is on, call light 
is within reach. will continue to monitor for safety and comfort.

## 2019-01-18 VITALS — SYSTOLIC BLOOD PRESSURE: 112 MMHG | DIASTOLIC BLOOD PRESSURE: 72 MMHG

## 2019-01-18 VITALS — SYSTOLIC BLOOD PRESSURE: 130 MMHG | DIASTOLIC BLOOD PRESSURE: 72 MMHG

## 2019-01-18 VITALS — DIASTOLIC BLOOD PRESSURE: 78 MMHG | SYSTOLIC BLOOD PRESSURE: 128 MMHG

## 2019-01-18 VITALS — DIASTOLIC BLOOD PRESSURE: 58 MMHG | SYSTOLIC BLOOD PRESSURE: 107 MMHG

## 2019-01-18 VITALS — DIASTOLIC BLOOD PRESSURE: 67 MMHG | SYSTOLIC BLOOD PRESSURE: 97 MMHG

## 2019-01-18 LAB
ADD MANUAL DIFF: YES
ALBUMIN SERPL-MCNC: 2.6 G/DL (ref 3.4–5)
ALBUMIN/GLOB SERPL: 1 {RATIO} (ref 1–2.7)
ALP SERPL-CCNC: 67 U/L (ref 46–116)
ALT SERPL-CCNC: 20 U/L (ref 12–78)
AMYLASE SERPL-CCNC: 45 U/L (ref 25–115)
ANION GAP SERPL CALC-SCNC: 7 MMOL/L (ref 5–15)
APTT BLD: 23 SEC (ref 23–33)
AST SERPL-CCNC: 17 U/L (ref 15–37)
BILIRUB SERPL-MCNC: 0.2 MG/DL (ref 0.2–1)
BUN SERPL-MCNC: 9 MG/DL (ref 7–18)
CALCIUM SERPL-MCNC: 7.7 MG/DL (ref 8.5–10.1)
CHLORIDE SERPL-SCNC: 108 MMOL/L (ref 98–107)
CO2 SERPL-SCNC: 26 MMOL/L (ref 21–32)
CREAT SERPL-MCNC: 0.7 MG/DL (ref 0.55–1.3)
ERYTHROCYTE [DISTWIDTH] IN BLOOD BY AUTOMATED COUNT: 14.7 % (ref 11.6–14.8)
GLOBULIN SER-MCNC: 2.7 G/DL
HCT VFR BLD CALC: 23.5 % (ref 37–47)
HGB BLD-MCNC: 7.5 G/DL (ref 12–16)
INR PPP: 0.9 (ref 0.9–1.1)
MCV RBC AUTO: 92 FL (ref 80–99)
PLATELET # BLD: 163 K/UL (ref 150–450)
POTASSIUM SERPL-SCNC: 3.7 MMOL/L (ref 3.5–5.1)
RBC # BLD AUTO: 2.54 M/UL (ref 4.2–5.4)
SODIUM SERPL-SCNC: 141 MMOL/L (ref 136–145)
WBC # BLD AUTO: 6.6 K/UL (ref 4.8–10.8)

## 2019-01-18 RX ADMIN — HUMAN INSULIN SCH MLS/HR: 100 INJECTION, SOLUTION SUBCUTANEOUS at 01:16

## 2019-01-18 RX ADMIN — HUMAN INSULIN SCH MLS/HR: 100 INJECTION, SOLUTION SUBCUTANEOUS at 12:00

## 2019-01-18 NOTE — NUR
NURSE NOTES:

patient is being prepared for discharge, taken IV access, no bleeding noted after site 
compression. Patient signed off the belongings list and left with all her belongings. Given 
discharge packet with medication reconciliation and educational pamphlet regarding her 
condition. Patient previously complained of headache, given Tylenol. Given meds ordered by 
 for her to take home, provided educational material regarding those. Patient in stable 
condition and VVSS. Notified patient's daughter Paty regarding the discharge. Provided 
taxi voucher, awaiting taxi service to arrive.

## 2019-01-18 NOTE — NUR
NURSE NOTES:

received patient lying in bed, no complaint of pain or discomfort. Patient receives IVF 
through left hand access, no redness, leakage or infiltration noted. Call light within easy 
reach, siderails upx2, bed locked at the lowest position possible. Will continue to monitor 
patient and follow up with the plan of care.

## 2019-01-18 NOTE — DISCHARGE SUMMARY
DATE OF ADMISSION:  01/17/2019



DATE OF DISCHARGE:  01/18/2019



CHIEF COMPLAINT:  Abdominal pain.



HISTORY OF PRESENT ILLNESS:  This is a 43-year-old  female who

denies any past medical history and past surgical history, who was

presented to the hospital complaining about abdominal pain associated with

nausea, vomiting, emesis.  Shortly after initial evaluation, the patient

was admitted to the hospital with hematemesis as well as epigastric pain.

Throughout the hospital course, the patient was consulted with Dr. Pio Sinclair from Gastroenterology and Dr. Wilson from Pulmonary/Critical

Care.  The patient underwent EGD and colonoscopy, which noted the patient

has gastritis as well as Mela-Marshall tear at the GE junction at about 5

o'clock position with small blood clots sitting on it.  The patient

tolerated the procedure well and subsequently was discharged home today to

be followed up as an outpatient with Dr. Sinclair.



FINAL DIAGNOSES:  Abdominal pain and upper GI bleed, most likely secondary

to Mela-Marshall tear as well as gastritis.



MEDICATIONS ON DISCHARGE:  Continue discharge medication list.



ACTIVITY:  As tolerated.



DIET:  Would be clear-liquid diet, advance as tolerated.



FOLLOWUP:  The patient was advised to follow up with Dr. Sinclair from

Gastroenterology within one week.









  ______________________________________________

  Norberto Cintron M.D.





DR:  Rafael

D:  01/18/2019 16:36

T:  01/18/2019 22:06

JOB#:  [id number]

CC:

## 2019-01-18 NOTE — PULMONOLOGY PROGRESS NOTE
Assessment/Plan


Problems:  


(1) GI bleed


(2) Alcohol abuse


(3) Hemorrhagic shock


(4) ETOH abuse


Assessment/Plan


EGD noticed, Mela Marshall was seen





eating well.


dc home with oral Iron and pepcid





Subjective


ROS Limited/Unobtainable:  No


Constitutional:  Reports: no symptoms


HEENT:  Repors: no symptoms


Respiratory:  Reports: no symptoms


Allergies:  


Coded Allergies:  


     No Known Allergies (Unverified , 9/23/12)





Objective





Last 24 Hour Vital Signs








  Date Time  Temp Pulse Resp B/P (MAP) Pulse Ox O2 Delivery O2 Flow Rate FiO2


 


1/18/19 12:00 98.2 93 18 107/58 (74)    


 


1/18/19 09:00      Room Air  


 


1/18/19 08:00 98.3 76 18 128/78 (95)    


 


1/18/19 04:00 97.7 84 18 130/72 (91)    


 


1/18/19 01:00 97.7 86 18 97/67 (77)    


 


1/17/19 21:00      Room Air  


 


1/17/19 20:00 98.4 96 18 108/52 (70)    


 


1/17/19 18:29  99 18  100   


 


1/17/19 18:27  117 18  100   


 


1/17/19 16:00 98.6 100  106/76 (86)    

















Intake and Output  


 


 1/17/19 1/18/19





 18:59 06:59


 


Intake Total 1160 ml 800 ml


 


Output Total 0 ml 


 


Balance 1160 ml 800 ml


 


  


 


Intake Oral 360 ml 


 


IV Total 800 ml 800 ml


 


Output Estimated Blood Loss 0 ml 


 


# Voids  2








General Appearance:  WD/WN


HEENT:  normocephalic


Respiratory/Chest:  chest wall non-tender, lungs clear


Breasts:  no masses


Cardiovascular:  normal peripheral pulses


Abdomen:  normal bowel sounds, soft, non tender


Genitourinary:  normal external genitalia


Extremities:  no cyanosis


Neurologic/Psychiatric:  CNs II-XII grossly normal


Laboratory Tests


1/17/19 17:45: 


White Blood Count 9.6, Red Blood Count 2.87L, Hemoglobin 8.5L, Hematocrit 26.3L

, Mean Corpuscular Volume 92, Mean Corpuscular Hemoglobin 29.8, Mean 

Corpuscular Hemoglobin Concent 32.5, Red Cell Distribution Width 14.7, Platelet 

Count 190, Mean Platelet Volume 10.5H, Neutrophils (%) (Auto) 61.7, Lymphocytes 

(%) (Auto) 28.1, Monocytes (%) (Auto) 9.2, Eosinophils (%) (Auto) 0.5, 

Basophils (%) (Auto) 0.6


1/18/19 06:00: 


White Blood Count 6.6, Red Blood Count 2.54L, Hemoglobin 7.5L, Hematocrit 23.5L

, Mean Corpuscular Volume 92, Mean Corpuscular Hemoglobin 29.4, Mean 

Corpuscular Hemoglobin Concent 31.8L, Red Cell Distribution Width 14.7, 

Platelet Count 163, Mean Platelet Volume 9.8, Neutrophils (%) (Auto) , 

Lymphocytes (%) (Auto) , Monocytes (%) (Auto) , Eosinophils (%) (Auto) , 

Basophils (%) (Auto) , Differential Total Cells Counted 100, Neutrophils % (

Manual) 55, Lymphocytes % (Manual) 32, Monocytes % (Manual) 8, Eosinophils % (

Manual) 4H, Basophils % (Manual) 1, Band Neutrophils 0, Platelet Estimate 

Adequate, Platelet Morphology Normal, Hypochromasia 3+, Anisocytosis 1+, 

Prothrombin Time 9.9, Prothromb Time International Ratio 0.9, Activated Partial 

Thromboplast Time 23, Sodium Level 141, Potassium Level 3.7, Chloride Level 108H

, Carbon Dioxide Level 26, Anion Gap 7, Blood Urea Nitrogen 9, Creatinine 0.7, 

Estimat Glomerular Filtration Rate > 60, Glucose Level 97, Calcium Level 7.7L, 

Total Bilirubin 0.2, Aspartate Amino Transf (AST/SGOT) 17, Alanine 

Aminotransferase (ALT/SGPT) 20, Alkaline Phosphatase 67, Total Protein 5.3L, 

Albumin 2.6L, Globulin 2.7, Albumin/Globulin Ratio 1.0, Amylase Level 45, 

Lipase 143





Current Medications








 Medications


  (Trade)  Dose


 Ordered  Sig/Heladio


 Route


 PRN Reason  Start Time


 Stop Time Status Last Admin


Dose Admin


 


 Acetaminophen


  (Tylenol)  650 mg  Q4H  PRN


 ORAL


 fever (temp>100.5F)  1/17/19 15:00


 2/16/19 14:59  1/17/19 15:46


 


 


 Al Hydroxide/Mg


 Hydroxide


  (Mylanta II)  30 ml  Q6H  PRN


 ORAL


 dyspepsia  1/17/19 15:00


 2/16/19 14:59   


 


 


 Dextrose


  (Dextrose 50%)  25 ml  Q30M  PRN


 IV


 Hypoglycemia  1/17/19 15:00


 2/16/19 14:59   


 


 


 Dextrose


  (Dextrose 50%)  50 ml  Q30M  PRN


 IV


 Hypoglycemia  1/17/19 15:00


 2/16/19 14:59   


 


 


 Dextrose/Sodium


 Chloride  1,000 ml @ 


 100 mls/hr  Q10H


 IV


   1/17/19 15:15


 2/16/19 10:14  1/18/19 12:00


 


 


 Diphenhydramine


 HCl


  (Benadryl)  25 mg  Q6H  PRN


 ORAL


 Itching/Pruritis  1/17/19 15:00


 2/16/19 14:59   


 


 


 Ondansetron HCl


  (Zofran)  4 mg  Q6H  PRN


 IVP


 Nausea & Vomiting  1/17/19 15:00


 2/16/19 14:59  1/18/19 01:22


 


 


 Temazepam


  (Restoril)  15 mg  HSPRN  PRN


 ORAL


 Insomnia  1/17/19 15:00


 1/24/19 14:59   


 

















Christina Wilson MD Jan 18, 2019 14:36

## 2019-01-18 NOTE — NUR
NURSE NOTES:

Taxi service called, taxi is at hospital entrance. Patient is being wheeled down to hospital 
lobby by JENNIFER Carrillo. Patient left with all her belongings and discharge packet, along with 2 
prescription meds, iron sulfate and protonix, filled at [facility]. Patient in stable 
condition, no complaint of pain or dsicomfort.

## 2019-01-18 NOTE — GI PROGRESS NOTE
Assessment/Plan


Problems:  


(1) GI bleed


ICD Codes:  K92.2 - Gastrointestinal hemorrhage, unspecified


SNOMED:  14333806


(2) Alcohol abuse


ICD Codes:  F10.10 - Alcohol abuse, uncomplicated


SNOMED:  88687848


(3) Acute alcoholic intoxication


ICD Codes:  F10.129 - Alcohol abuse with intoxication, unspecified


SNOMED:  19663494


(4) Hemorrhagic shock


ICD Codes:  R57.8 - Other shock


SNOMED:  829088


(5) ETOH abuse


ICD Codes:  F10.10 - Alcohol abuse, uncomplicated


SNOMED:  73610041


Status:  stable, progressing


Status Narrative


Discussed with Dr. Sinclair


Assessment/Plan


SUMMARY OF FINDINGS:


1. A single Mela-Marshall tear at the GE junction at about 5 o'clock position 

with a small blood clot sitting on it.


2. Gastritis, status post biopsy.





RECOMMENDATIONS:


1. Continue on PPI.


2. Follow up biopsy results and treat accordingly.


3. Start soft diet.


4. Monitor for recurrent GI bleeding, PRN transfusions


Follow-up labs





The patient was seen and examined at bedside and all new and available data was 

reviewed in the patients chart. I agree with the above findings, impression 

and plan.  (Patient seen earlier today. Signature stamp does not reflect 

patient encounter time.). - Pio Sinclair MD





Subjective


Gastrointestinal/Abdominal:  Reports: no symptoms


Subjective


Abdominal pain improved





Objective





Last 24 Hour Vital Signs








  Date Time  Temp Pulse Resp B/P (MAP) Pulse Ox O2 Delivery O2 Flow Rate FiO2


 


1/18/19 09:00      Room Air  


 


1/18/19 08:00 98.3 76 18 128/78 (95)    


 


1/18/19 04:00 97.7 84 18 130/72 (91)    


 


1/18/19 01:00 97.7 86 18 97/67 (77)    


 


1/17/19 21:00      Room Air  


 


1/17/19 20:00 98.4 96 18 108/52 (70)    


 


1/17/19 18:29  99 18  100   


 


1/17/19 18:27  117 18  100   


 


1/17/19 16:00 98.6 100  106/76 (86)    


 


1/17/19 13:04 97.7 99  115/69 (84)    


 


1/17/19 12:35 98.0 99 18 117/73 100 Nasal Cannula 3 


 


1/17/19 12:25  99 21 125/71 100 Nasal Cannula 3 


 


1/17/19 12:20  104 22 122/69 100 Nasal Cannula 3 


 


1/17/19 12:18 98.2 117 18 124/75 100 Nasal Cannula 3 


 


1/17/19 12:00 97.7 99  115/69 (84)    


 


1/17/19 12:00      Room Air  


 


1/17/19 11:49  111      

















Intake and Output  


 


 1/17/19 1/18/19





 18:59 06:59


 


Intake Total 1160 ml 800 ml


 


Output Total 0 ml 


 


Balance 1160 ml 800 ml


 


  


 


Intake Oral 360 ml 


 


IV Total 800 ml 800 ml


 


Output Estimated Blood Loss 0 ml 


 


# Voids  2











Laboratory Tests








Test


  1/17/19


17:45 1/18/19


06:00


 


White Blood Count


  9.6 K/UL


(4.8-10.8) 6.6 K/UL


(4.8-10.8)


 


Red Blood Count


  2.87 M/UL


(4.20-5.40)  L 2.54 M/UL


(4.20-5.40)  L


 


Hemoglobin


  8.5 G/DL


(12.0-16.0)  L 7.5 G/DL


(12.0-16.0)  L


 


Hematocrit


  26.3 %


(37.0-47.0)  L 23.5 %


(37.0-47.0)  L


 


Mean Corpuscular Volume 92 FL (80-99)   92 FL (80-99)  


 


Mean Corpuscular Hemoglobin


  29.8 PG


(27.0-31.0) 29.4 PG


(27.0-31.0)


 


Mean Corpuscular Hemoglobin


Concent 32.5 G/DL


(32.0-36.0) 31.8 G/DL


(32.0-36.0)  L


 


Red Cell Distribution Width


  14.7 %


(11.6-14.8) 14.7 %


(11.6-14.8)


 


Platelet Count


  190 K/UL


(150-450) 163 K/UL


(150-450)


 


Mean Platelet Volume


  10.5 FL


(6.5-10.1)  H 9.8 FL


(6.5-10.1)


 


Neutrophils (%) (Auto)


  61.7 %


(45.0-75.0) % (45.0-75.0)


 


 


Lymphocytes (%) (Auto)


  28.1 %


(20.0-45.0) % (20.0-45.0)


 


 


Monocytes (%) (Auto)


  9.2 %


(1.0-10.0)  % (1.0-10.0)  


 


 


Eosinophils (%) (Auto)


  0.5 %


(0.0-3.0)  % (0.0-3.0)  


 


 


Basophils (%) (Auto)


  0.6 %


(0.0-2.0)  % (0.0-2.0)  


 


 


Differential Total Cells


Counted 


  100  


 


 


Neutrophils % (Manual)  55 % (45-75)  


 


Lymphocytes % (Manual)  32 % (20-45)  


 


Monocytes % (Manual)  8 % (1-10)  


 


Eosinophils % (Manual)  4 % (0-3)  H


 


Basophils % (Manual)  1 % (0-2)  


 


Band Neutrophils  0 % (0-8)  


 


Platelet Estimate  Adequate  


 


Platelet Morphology  Normal  


 


Hypochromasia  3+  


 


Anisocytosis  1+  


 


Prothrombin Time


  


  9.9 SEC


(9.30-11.50)


 


Prothromb Time International


Ratio 


  0.9 (0.9-1.1)  


 


 


Activated Partial


Thromboplast Time 


  23 SEC (23-33)


 


 


Sodium Level


  


  141 MMOL/L


(136-145)


 


Potassium Level


  


  3.7 MMOL/L


(3.5-5.1)


 


Chloride Level


  


  108 MMOL/L


()  H


 


Carbon Dioxide Level


  


  26 MMOL/L


(21-32)


 


Anion Gap


  


  7 mmol/L


(5-15)


 


Blood Urea Nitrogen


  


  9 mg/dL (7-18)


 


 


Creatinine


  


  0.7 MG/DL


(0.55-1.30)


 


Estimat Glomerular Filtration


Rate 


  > 60 mL/min


(>60)


 


Glucose Level


  


  97 MG/DL


()


 


Calcium Level


  


  7.7 MG/DL


(8.5-10.1)  L


 


Total Bilirubin


  


  0.2 MG/DL


(0.2-1.0)


 


Aspartate Amino Transf


(AST/SGOT) 


  17 U/L (15-37)


 


 


Alanine Aminotransferase


(ALT/SGPT) 


  20 U/L (12-78)


 


 


Alkaline Phosphatase


  


  67 U/L


()


 


Total Protein


  


  5.3 G/DL


(6.4-8.2)  L


 


Albumin


  


  2.6 G/DL


(3.4-5.0)  L


 


Globulin  2.7 g/dL  


 


Albumin/Globulin Ratio  1.0 (1.0-2.7)  


 


Amylase Level


  


  45 U/L


()


 


Lipase


  


  143 U/L


()








Height (Feet):  4


Height (Inches):  9.00


Weight (Pounds):  110


General Appearance:  WD/WN, no apparent distress, alert


Cardiovascular:  normal rate


Respiratory/Chest:  normal breath sounds, no respiratory distress


Abdominal Exam:  normal bowel sounds, non tender, soft


Extremities:  normal range of motion, non-tender











Kim Dejesus NP Jan 18, 2019 11:20

## 2019-01-18 NOTE — DISCHARGE SUMMARY
Discharge Summary


Hospital Course


Date of Admission


Jan 17, 2019 at 08:00


Date of Discharge





Admitting Diagnosis


Upper GI bleeding


HPI


Libby Nelson is a 43 year old female who was admitted on Jan 17, 2019 

at 08:00 for Upper Gastrointestinal Bleeding


Hospital Course


Job # [id number]





Discharge


Discharge Disposition


Patient was discharged to Home (01)











Norberto Cintron MD Jan 18, 2019 16:35

## 2019-01-18 NOTE — CARDIOLOGY REPORT
--------------- APPROVED REPORT --------------





EKG Measurement

Heart [id number]

NV [id number]

[id number]

[id number]

[id number]





Sinus tachycardia

Otherwise normal ECG

## 2019-01-20 NOTE — NUR
CASE MANAGEMENT:



CM review and clinical information (face sheet/ ER MD Note/ H&P/oper report/ DC 
instructions) faxed to [employer] @ [phone] and [facility] @ [phone].

## 2019-01-21 NOTE — DIAGNOSTIC IMAGING REPORT
--------------- APPROVED REPORT --------------





CPT Code: 84782



Present Symptoms

Comments: Pain





BILATERAL: Imaging reveals a patent deep venous system bilaterally. There is no evidence 

of thrombus within the femoral, popliteal or tibial segments. The greater saphenous veins 

are also within normal limits. Doppler indicates normal spontaneous flow within these 

segments.